# Patient Record
Sex: MALE | Race: WHITE | NOT HISPANIC OR LATINO | URBAN - METROPOLITAN AREA
[De-identification: names, ages, dates, MRNs, and addresses within clinical notes are randomized per-mention and may not be internally consistent; named-entity substitution may affect disease eponyms.]

---

## 2017-09-21 ENCOUNTER — OFFICE (OUTPATIENT)
Dept: URBAN - METROPOLITAN AREA CLINIC 10 | Facility: CLINIC | Age: 42
End: 2017-09-21
Payer: COMMERCIAL

## 2017-09-21 VITALS
HEIGHT: 73 IN | HEART RATE: 58 BPM | DIASTOLIC BLOOD PRESSURE: 80 MMHG | SYSTOLIC BLOOD PRESSURE: 117 MMHG | WEIGHT: 227 LBS

## 2017-09-21 DIAGNOSIS — R94.5 ABNORMAL RESULTS OF LIVER FUNCTION STUDIES: ICD-10-CM

## 2017-09-21 DIAGNOSIS — I10 ESSENTIAL (PRIMARY) HYPERTENSION: ICD-10-CM

## 2017-09-21 DIAGNOSIS — R12 HEARTBURN: ICD-10-CM

## 2017-09-21 DIAGNOSIS — Z12.11 ENCOUNTER FOR SCREENING FOR MALIGNANT NEOPLASM OF COLON: ICD-10-CM

## 2017-09-21 DIAGNOSIS — E66.3 OVERWEIGHT: ICD-10-CM

## 2017-09-21 DIAGNOSIS — K92.2 GASTROINTESTINAL HEMORRHAGE, UNSPECIFIED: ICD-10-CM

## 2017-09-21 DIAGNOSIS — R13.10 DYSPHAGIA, UNSPECIFIED: ICD-10-CM

## 2017-09-21 LAB
ACTIN (SMOOTH MUSCLE) ANTIBODY: 7 UNITS (ref 0–19)
ANTINUCLEAR ANTIBODIES, IFA: NEGATIVE
HBSAG SCREEN: NEGATIVE
HCV ANTIBODY: HEP C VIRUS AB: <0.1 S/CO RATIO
HEP A AB, TOTAL: NEGATIVE
HEP B SURFACE AB: HEP B SURFACE AB, QUAL: NON REACTIVE
IMMUNOGLOBULIN A, QN, SERUM: 214 MG/DL (ref 90–386)
T-TRANSGLUTAMINASE (TTG) IGA: <2 U/ML

## 2017-09-21 PROCEDURE — 99244 OFF/OP CNSLTJ NEW/EST MOD 40: CPT | Performed by: INTERNAL MEDICINE

## 2017-09-21 RX ORDER — PANTOPRAZOLE SODIUM 40 MG/1
TABLET, DELAYED RELEASE ORAL
Qty: 90 | Refills: 3 | Status: COMPLETED
Start: 2017-09-21 | End: 2020-07-06

## 2017-09-21 NOTE — SERVICEHPINOTES
Mr. Walker is 41 years old. He is here today in consultation with Dr. Jacobson for eye family history of colon cancer and elevated liver enzymes. His father had colon cancer at age 56. His paternal grandmother had colon cancer at an age >60. He had a colonoscopy 2014 in [place] and not polyps but told that he had colitis. He had a colonoscopy in 2001 with polyps and had to repeat 3 years later. He denies have genetic testing. He denies further colon polyps since 2001. He describes being diagnosed with pancreatitis in 2014. He describes having 2 bouts since that were milder. He describes his pancreatitis as extreme pain in his chest. He denies previous cholecystectomy. He consumes 15-18 EtOH drinks / week. He has occasional blood in his stool that he describes as bright red with otherwise normal appearing stools. He describes occasional sharp pain at anus with defecation that is only when "my diet goes off the rails". He describes long-standing heartburn and has associated dysphagia to solids without weight loss or hematemesis. He denies family history of liver disease.07/2017-review of records from Dr. Jacobson: AST 33/ ALT 52/total bili 0.2/alk-phos 65/albumin 4.6.BR-hematocrit 44.6, platelets 235, MCV 92.BR-TSH and free T4 normal.

## 2020-07-06 ENCOUNTER — OFFICE (OUTPATIENT)
Dept: URBAN - METROPOLITAN AREA CLINIC 10 | Facility: CLINIC | Age: 45
End: 2020-07-06

## 2020-07-06 VITALS
DIASTOLIC BLOOD PRESSURE: 73 MMHG | HEART RATE: 69 BPM | HEIGHT: 73 IN | WEIGHT: 212 LBS | SYSTOLIC BLOOD PRESSURE: 118 MMHG

## 2020-07-06 DIAGNOSIS — K58.0 IRRITABLE BOWEL SYNDROME WITH DIARRHEA: ICD-10-CM

## 2020-07-06 DIAGNOSIS — K85.90 ACUTE PANCREATITIS WITHOUT NECROSIS OR INFECTION, UNSPECIFIE: ICD-10-CM

## 2020-07-06 DIAGNOSIS — R94.5 ABNORMAL RESULTS OF LIVER FUNCTION STUDIES: ICD-10-CM

## 2020-07-06 DIAGNOSIS — Z80.0 FAMILY HISTORY OF MALIGNANT NEOPLASM OF DIGESTIVE ORGANS: ICD-10-CM

## 2020-07-06 LAB
CBC, PLATELET, NO DIFFERENTIAL: HEMATOCRIT: 48.5 % (ref 37.5–51)
CBC, PLATELET, NO DIFFERENTIAL: HEMOGLOBIN: 16.1 G/DL (ref 13–17.7)
CBC, PLATELET, NO DIFFERENTIAL: MCH: 31.2 PG (ref 26.6–33)
CBC, PLATELET, NO DIFFERENTIAL: MCHC: 33.2 G/DL (ref 31.5–35.7)
CBC, PLATELET, NO DIFFERENTIAL: MCV: 94 FL (ref 79–97)
CBC, PLATELET, NO DIFFERENTIAL: PLATELETS: 288 X10E3/UL (ref 150–450)
CBC, PLATELET, NO DIFFERENTIAL: RBC: 5.16 X10E6/UL (ref 4.14–5.8)
CBC, PLATELET, NO DIFFERENTIAL: RDW: 12.1 % (ref 11.6–15.4)
CBC, PLATELET, NO DIFFERENTIAL: WBC: 9.9 X10E3/UL (ref 3.4–10.8)
COMP. METABOLIC PANEL (14): A/G RATIO: 2 (ref 1.2–2.2)
COMP. METABOLIC PANEL (14): ALBUMIN: 4.7 G/DL (ref 4–5)
COMP. METABOLIC PANEL (14): ALKALINE PHOSPHATASE: 65 IU/L (ref 39–117)
COMP. METABOLIC PANEL (14): ALT (SGPT): 25 IU/L (ref 0–44)
COMP. METABOLIC PANEL (14): AST (SGOT): 21 IU/L (ref 0–40)
COMP. METABOLIC PANEL (14): BILIRUBIN, TOTAL: 0.2 MG/DL (ref 0–1.2)
COMP. METABOLIC PANEL (14): BUN/CREATININE RATIO: 20 (ref 9–20)
COMP. METABOLIC PANEL (14): BUN: 18 MG/DL (ref 6–24)
COMP. METABOLIC PANEL (14): CALCIUM: 9.8 MG/DL (ref 8.7–10.2)
COMP. METABOLIC PANEL (14): CARBON DIOXIDE, TOTAL: 25 MMOL/L (ref 20–29)
COMP. METABOLIC PANEL (14): CHLORIDE: 102 MMOL/L (ref 96–106)
COMP. METABOLIC PANEL (14): CREATININE: 0.9 MG/DL (ref 0.76–1.27)
COMP. METABOLIC PANEL (14): EGFR IF AFRICN AM: 121 ML/MIN/1.73 (ref 59–?)
COMP. METABOLIC PANEL (14): EGFR IF NONAFRICN AM: 105 ML/MIN/1.73 (ref 59–?)
COMP. METABOLIC PANEL (14): GLOBULIN, TOTAL: 2.4 G/DL (ref 1.5–4.5)
COMP. METABOLIC PANEL (14): GLUCOSE: 82 MG/DL (ref 65–99)
COMP. METABOLIC PANEL (14): POTASSIUM: 5.5 MMOL/L — HIGH (ref 3.5–5.2)
COMP. METABOLIC PANEL (14): PROTEIN, TOTAL: 7.1 G/DL (ref 6–8.5)
COMP. METABOLIC PANEL (14): SODIUM: 143 MMOL/L (ref 134–144)

## 2020-07-06 PROCEDURE — 99215 OFFICE O/P EST HI 40 MIN: CPT | Performed by: INTERNAL MEDICINE

## 2020-07-06 RX ORDER — SODIUM SULFATE, POTASSIUM SULFATE, MAGNESIUM SULFATE 17.5; 3.13; 1.6 G/ML; G/ML; G/ML
SOLUTION, CONCENTRATE ORAL
Qty: 1 | Refills: 0 | Status: COMPLETED
Start: 2020-07-06 | End: 2020-09-25

## 2020-07-06 RX ORDER — HYOSCYAMINE SULFATE 0.12 MG/1
TABLET ORAL; SUBLINGUAL
Qty: 30 | Refills: 3 | Status: ACTIVE
Start: 2020-07-06

## 2020-07-06 NOTE — SERVICEHPINOTES
Mr. Walker is 44 years old. He is here today in consultation with Dr. Goins for consideration of screening colonoscopy. He was here in 2017 and was scheduled for colonoscopy and EGD for dysphagia. He describes bilateral lower quadrant cramping with associated retchign that is non-productive. He will have diarreha when the pain occurs and is associated with blood in the stool. He describes as once / month and realted to eating although, he can't relate to a specific food. He feels better after diarrhea. He denies dysphaiga or heartburn.His father had colon cancer at age 56. His paternal grandmother had colon cancer at an age >60. He had a colonoscopy 2014 in [place] and told had colitis. He had a colonoscopy in 2001 with polyps and had to repeat 3 years later. He denies have genetic testing. He denies further colon polyps since 2001. He describes being diagnosed with pancreatitis in 2014. He denies EtOH. He stopped EtOH 1.5 years ago. He was drinking vodka and beer. He was drinking 1/5th week along with a case of beer / week. He had been drinking consistently for 25 years.  09/2017-hep B surface antibody nonreactiveBR-hep B surface antigen negativeBR-hep C antibody negativeBR-hep a total antibody negativeBR-anti smooth muscle antibody negativeBR-MC negativeBR-TTG IgA negative with normal IgA level07/2017-review of records from Dr. Jacobson: AST 33/ ALT 52/total bili 0.2/alk-phos 65/albumin 4.6.BR-hematocrit 44.6, platelets 235, MCV 92.BR-TSH and free T4 normal.

## 2020-09-25 ENCOUNTER — AMBULATORY SURGICAL CENTER (OUTPATIENT)
Dept: URBAN - METROPOLITAN AREA SURGERY 1 | Facility: SURGERY | Age: 45
End: 2020-09-25
Payer: COMMERCIAL

## 2020-09-25 VITALS
TEMPERATURE: 97.9 F | OXYGEN SATURATION: 96 % | DIASTOLIC BLOOD PRESSURE: 56 MMHG | DIASTOLIC BLOOD PRESSURE: 64 MMHG | DIASTOLIC BLOOD PRESSURE: 56 MMHG | OXYGEN SATURATION: 96 % | HEART RATE: 74 BPM | SYSTOLIC BLOOD PRESSURE: 97 MMHG | HEART RATE: 67 BPM | RESPIRATION RATE: 18 BRPM | SYSTOLIC BLOOD PRESSURE: 121 MMHG | HEIGHT: 73 IN | HEART RATE: 72 BPM | SYSTOLIC BLOOD PRESSURE: 107 MMHG | HEART RATE: 63 BPM | SYSTOLIC BLOOD PRESSURE: 107 MMHG | DIASTOLIC BLOOD PRESSURE: 56 MMHG | HEIGHT: 73 IN | TEMPERATURE: 98.1 F | SYSTOLIC BLOOD PRESSURE: 121 MMHG | SYSTOLIC BLOOD PRESSURE: 107 MMHG | RESPIRATION RATE: 16 BRPM | SYSTOLIC BLOOD PRESSURE: 102 MMHG | DIASTOLIC BLOOD PRESSURE: 63 MMHG | OXYGEN SATURATION: 98 % | SYSTOLIC BLOOD PRESSURE: 119 MMHG | SYSTOLIC BLOOD PRESSURE: 121 MMHG | DIASTOLIC BLOOD PRESSURE: 63 MMHG | SYSTOLIC BLOOD PRESSURE: 102 MMHG | RESPIRATION RATE: 16 BRPM | SYSTOLIC BLOOD PRESSURE: 119 MMHG | OXYGEN SATURATION: 99 % | DIASTOLIC BLOOD PRESSURE: 84 MMHG | HEART RATE: 72 BPM | DIASTOLIC BLOOD PRESSURE: 60 MMHG | HEART RATE: 74 BPM | RESPIRATION RATE: 18 BRPM | WEIGHT: 210 LBS | TEMPERATURE: 98.1 F | RESPIRATION RATE: 18 BRPM | DIASTOLIC BLOOD PRESSURE: 63 MMHG | WEIGHT: 210 LBS | HEART RATE: 67 BPM | DIASTOLIC BLOOD PRESSURE: 60 MMHG | HEIGHT: 73 IN | HEART RATE: 72 BPM | OXYGEN SATURATION: 99 % | RESPIRATION RATE: 20 BRPM | WEIGHT: 210 LBS | OXYGEN SATURATION: 96 % | DIASTOLIC BLOOD PRESSURE: 60 MMHG | HEART RATE: 63 BPM | SYSTOLIC BLOOD PRESSURE: 119 MMHG | DIASTOLIC BLOOD PRESSURE: 64 MMHG | OXYGEN SATURATION: 98 % | DIASTOLIC BLOOD PRESSURE: 84 MMHG | HEART RATE: 67 BPM | OXYGEN SATURATION: 98 % | DIASTOLIC BLOOD PRESSURE: 64 MMHG | RESPIRATION RATE: 16 BRPM | TEMPERATURE: 97.9 F | DIASTOLIC BLOOD PRESSURE: 84 MMHG | SYSTOLIC BLOOD PRESSURE: 97 MMHG | HEART RATE: 63 BPM | HEART RATE: 74 BPM | SYSTOLIC BLOOD PRESSURE: 102 MMHG | TEMPERATURE: 98.1 F | TEMPERATURE: 97.9 F | OXYGEN SATURATION: 99 % | RESPIRATION RATE: 20 BRPM | SYSTOLIC BLOOD PRESSURE: 97 MMHG | RESPIRATION RATE: 20 BRPM

## 2020-09-25 DIAGNOSIS — Z80.0 FAMILY HISTORY OF MALIGNANT NEOPLASM OF DIGESTIVE ORGANS: ICD-10-CM

## 2020-09-25 DIAGNOSIS — K57.30 DIVERTICULOSIS OF LARGE INTESTINE WITHOUT PERFORATION OR ABS: ICD-10-CM

## 2020-09-25 DIAGNOSIS — Z12.11 ENCOUNTER FOR SCREENING FOR MALIGNANT NEOPLASM OF COLON: ICD-10-CM

## 2020-09-25 DIAGNOSIS — K64.0 FIRST DEGREE HEMORRHOIDS: ICD-10-CM

## 2020-09-25 PROCEDURE — G0105 COLORECTAL SCRN; HI RISK IND: HCPCS | Performed by: INTERNAL MEDICINE

## 2020-09-25 NOTE — SERVICENOTES
Start time: 0724
Cecum: 0727
TI intubation: no 
End time:0732

[place] Bowel Prep Score :  8
Right colon:                       3
transverse colon:               2
left colon:                          3

## 2020-09-25 NOTE — SERVICENOTES
Start time: 0724
Cecum: 0727
TI intubation: no 
End time:0732

[place] Bowel Prep Score :  8
Right colon:                       3
transverse colon:               2
left colon:                          3

## 2020-09-25 NOTE — SERVICENOTES
Start time: 0724
Cecum: 0727
TI intubation: no 
End time:0732

[place] Bowel Prep Score :  8
Right colon:                       3
transverse colon:               2
left colon:                          3

## 2021-09-27 ENCOUNTER — OFFICE (OUTPATIENT)
Dept: URBAN - METROPOLITAN AREA CLINIC 10 | Facility: CLINIC | Age: 46
End: 2021-09-27
Payer: COMMERCIAL

## 2021-09-27 VITALS
DIASTOLIC BLOOD PRESSURE: 81 MMHG | HEIGHT: 73 IN | SYSTOLIC BLOOD PRESSURE: 111 MMHG | OXYGEN SATURATION: 94 % | HEART RATE: 65 BPM | WEIGHT: 218 LBS

## 2021-09-27 DIAGNOSIS — K64.4 RESIDUAL HEMORRHOIDAL SKIN TAGS: ICD-10-CM

## 2021-09-27 DIAGNOSIS — K92.1 MELENA: ICD-10-CM

## 2021-09-27 DIAGNOSIS — R12 HEARTBURN: ICD-10-CM

## 2021-09-27 DIAGNOSIS — K21.9 GASTRO-ESOPHAGEAL REFLUX DISEASE WITHOUT ESOPHAGITIS: ICD-10-CM

## 2021-09-27 DIAGNOSIS — K64.8 OTHER HEMORRHOIDS: ICD-10-CM

## 2021-09-27 LAB
AMYLASE: 123 U/L — HIGH (ref 31–110)
CBC, PLATELET, NO DIFFERENTIAL: HEMATOCRIT: 49.6 % (ref 37.5–51)
CBC, PLATELET, NO DIFFERENTIAL: HEMOGLOBIN: 16.2 G/DL (ref 13–17.7)
CBC, PLATELET, NO DIFFERENTIAL: MCH: 30.6 PG (ref 26.6–33)
CBC, PLATELET, NO DIFFERENTIAL: MCHC: 32.7 G/DL (ref 31.5–35.7)
CBC, PLATELET, NO DIFFERENTIAL: MCV: 94 FL (ref 79–97)
CBC, PLATELET, NO DIFFERENTIAL: PLATELETS: 259 X10E3/UL (ref 150–450)
CBC, PLATELET, NO DIFFERENTIAL: RBC: 5.3 X10E6/UL (ref 4.14–5.8)
CBC, PLATELET, NO DIFFERENTIAL: RDW: 13.1 % (ref 11.6–15.4)
CBC, PLATELET, NO DIFFERENTIAL: WBC: 7.3 X10E3/UL (ref 3.4–10.8)
COMP. METABOLIC PANEL (14): A/G RATIO: 2 (ref 1.2–2.2)
COMP. METABOLIC PANEL (14): ALBUMIN: 4.7 G/DL (ref 4–5)
COMP. METABOLIC PANEL (14): ALKALINE PHOSPHATASE: 74 IU/L (ref 44–121)
COMP. METABOLIC PANEL (14): ALT (SGPT): 25 IU/L (ref 0–44)
COMP. METABOLIC PANEL (14): AST (SGOT): 22 IU/L (ref 0–40)
COMP. METABOLIC PANEL (14): BILIRUBIN, TOTAL: 0.2 MG/DL (ref 0–1.2)
COMP. METABOLIC PANEL (14): BUN/CREATININE RATIO: 22 — HIGH (ref 9–20)
COMP. METABOLIC PANEL (14): BUN: 21 MG/DL (ref 6–24)
COMP. METABOLIC PANEL (14): CALCIUM: 10.3 MG/DL — HIGH (ref 8.7–10.2)
COMP. METABOLIC PANEL (14): CARBON DIOXIDE, TOTAL: 27 MMOL/L (ref 20–29)
COMP. METABOLIC PANEL (14): CHLORIDE: 102 MMOL/L (ref 96–106)
COMP. METABOLIC PANEL (14): CREATININE: 0.96 MG/DL (ref 0.76–1.27)
COMP. METABOLIC PANEL (14): EGFR IF AFRICN AM: 111 ML/MIN/1.73 (ref 59–?)
COMP. METABOLIC PANEL (14): EGFR IF NONAFRICN AM: 96 ML/MIN/1.73 (ref 59–?)
COMP. METABOLIC PANEL (14): GLOBULIN, TOTAL: 2.4 G/DL (ref 1.5–4.5)
COMP. METABOLIC PANEL (14): GLUCOSE: 84 MG/DL (ref 65–99)
COMP. METABOLIC PANEL (14): POTASSIUM: 4.8 MMOL/L (ref 3.5–5.2)
COMP. METABOLIC PANEL (14): PROTEIN, TOTAL: 7.1 G/DL (ref 6–8.5)
COMP. METABOLIC PANEL (14): SODIUM: 139 MMOL/L (ref 134–144)
LIPASE: 44 U/L (ref 13–78)

## 2021-09-27 PROCEDURE — 46600 DIAGNOSTIC ANOSCOPY SPX: CPT | Performed by: INTERNAL MEDICINE

## 2021-09-27 PROCEDURE — 99214 OFFICE O/P EST MOD 30 MIN: CPT | Mod: 25 | Performed by: INTERNAL MEDICINE

## 2021-09-27 RX ORDER — OMEPRAZOLE 40 MG/1
CAPSULE, DELAYED RELEASE ORAL
Qty: 90 | Refills: 3 | Status: ACTIVE
Start: 2021-09-27

## 2021-09-27 RX ORDER — SODIUM PICOSULFATE, MAGNESIUM OXIDE, AND ANHYDROUS CITRIC ACID 10; 3.5; 12 MG/160ML; G/160ML; G/160ML
LIQUID ORAL
Qty: 1 | Refills: 0 | Status: COMPLETED
Start: 2021-09-27 | End: 2021-10-22

## 2021-10-22 ENCOUNTER — AMBULATORY SURGICAL CENTER (OUTPATIENT)
Dept: URBAN - METROPOLITAN AREA SURGERY 1 | Facility: SURGERY | Age: 46
End: 2021-10-22

## 2021-10-22 VITALS
DIASTOLIC BLOOD PRESSURE: 68 MMHG | SYSTOLIC BLOOD PRESSURE: 110 MMHG | DIASTOLIC BLOOD PRESSURE: 89 MMHG | DIASTOLIC BLOOD PRESSURE: 68 MMHG | SYSTOLIC BLOOD PRESSURE: 112 MMHG | SYSTOLIC BLOOD PRESSURE: 116 MMHG | SYSTOLIC BLOOD PRESSURE: 125 MMHG | WEIGHT: 220 LBS | SYSTOLIC BLOOD PRESSURE: 115 MMHG | SYSTOLIC BLOOD PRESSURE: 115 MMHG | TEMPERATURE: 98.4 F | TEMPERATURE: 97.3 F | WEIGHT: 220 LBS | HEART RATE: 71 BPM | WEIGHT: 220 LBS | DIASTOLIC BLOOD PRESSURE: 57 MMHG | HEART RATE: 75 BPM | OXYGEN SATURATION: 97 % | HEART RATE: 71 BPM | HEART RATE: 75 BPM | HEIGHT: 73 IN | HEART RATE: 71 BPM | HEART RATE: 83 BPM | HEART RATE: 87 BPM | OXYGEN SATURATION: 99 % | HEART RATE: 87 BPM | SYSTOLIC BLOOD PRESSURE: 115 MMHG | SYSTOLIC BLOOD PRESSURE: 114 MMHG | SYSTOLIC BLOOD PRESSURE: 116 MMHG | OXYGEN SATURATION: 98 % | HEART RATE: 75 BPM | HEART RATE: 90 BPM | SYSTOLIC BLOOD PRESSURE: 112 MMHG | RESPIRATION RATE: 16 BRPM | DIASTOLIC BLOOD PRESSURE: 63 MMHG | OXYGEN SATURATION: 100 % | OXYGEN SATURATION: 97 % | DIASTOLIC BLOOD PRESSURE: 70 MMHG | SYSTOLIC BLOOD PRESSURE: 114 MMHG | HEART RATE: 83 BPM | DIASTOLIC BLOOD PRESSURE: 57 MMHG | OXYGEN SATURATION: 96 % | SYSTOLIC BLOOD PRESSURE: 110 MMHG | RESPIRATION RATE: 18 BRPM | OXYGEN SATURATION: 98 % | OXYGEN SATURATION: 98 % | OXYGEN SATURATION: 100 % | SYSTOLIC BLOOD PRESSURE: 125 MMHG | TEMPERATURE: 98.4 F | DIASTOLIC BLOOD PRESSURE: 89 MMHG | HEART RATE: 69 BPM | HEIGHT: 73 IN | TEMPERATURE: 98.4 F | OXYGEN SATURATION: 99 % | SYSTOLIC BLOOD PRESSURE: 116 MMHG | HEART RATE: 90 BPM | HEIGHT: 73 IN | SYSTOLIC BLOOD PRESSURE: 110 MMHG | OXYGEN SATURATION: 97 % | DIASTOLIC BLOOD PRESSURE: 70 MMHG | HEART RATE: 87 BPM | DIASTOLIC BLOOD PRESSURE: 70 MMHG | DIASTOLIC BLOOD PRESSURE: 63 MMHG | DIASTOLIC BLOOD PRESSURE: 57 MMHG | OXYGEN SATURATION: 100 % | HEART RATE: 90 BPM | OXYGEN SATURATION: 96 % | OXYGEN SATURATION: 96 % | SYSTOLIC BLOOD PRESSURE: 125 MMHG | DIASTOLIC BLOOD PRESSURE: 63 MMHG | RESPIRATION RATE: 16 BRPM | TEMPERATURE: 97.3 F | RESPIRATION RATE: 18 BRPM | RESPIRATION RATE: 18 BRPM | HEART RATE: 69 BPM | HEART RATE: 83 BPM | SYSTOLIC BLOOD PRESSURE: 112 MMHG | HEART RATE: 69 BPM | SYSTOLIC BLOOD PRESSURE: 114 MMHG | DIASTOLIC BLOOD PRESSURE: 68 MMHG | RESPIRATION RATE: 16 BRPM | TEMPERATURE: 97.3 F | DIASTOLIC BLOOD PRESSURE: 89 MMHG | OXYGEN SATURATION: 99 %

## 2021-10-22 DIAGNOSIS — K92.1 MELENA: ICD-10-CM

## 2021-10-22 DIAGNOSIS — Z80.0 FAMILY HISTORY OF MALIGNANT NEOPLASM OF DIGESTIVE ORGANS: ICD-10-CM

## 2021-10-22 DIAGNOSIS — K57.30 DIVERTICULOSIS OF LARGE INTESTINE WITHOUT PERFORATION OR ABS: ICD-10-CM

## 2021-10-22 PROCEDURE — 45378 DIAGNOSTIC COLONOSCOPY: CPT | Performed by: INTERNAL MEDICINE

## 2021-10-22 NOTE — SERVICENOTES
Start: 1426
Cecum:1429
TI intubation: no
End:1435

[place] Bowel Prep Score:     4
-right colon:                              1
-transverse colon:                    1
-left colon:                                  2

## 2021-10-22 NOTE — SERVICEHPINOTES
45-year-old with family history of colon cancer in father at age less than 60 here for evaluation of hematocheziabr

## 2021-10-22 NOTE — SERVICENOTES
Start: 1426
Cecum:1429
TI intubation: no
End:1435

[place] Bowel Prep Score:     4
-right colon:                              1
-transverse colon:                    1
-left colon:                                  2

## 2021-10-22 NOTE — SERVICENOTES
Start: 1426
Cecum:1429
TI intubation: no
End:1435

[place] Bowel Prep Score:     4
-right colon:                              1
-transverse colon:                    1
-left colon:                                  2

## 2022-03-07 ENCOUNTER — OFFICE (OUTPATIENT)
Dept: URBAN - METROPOLITAN AREA CLINIC 10 | Facility: CLINIC | Age: 47
End: 2022-03-07

## 2022-03-07 VITALS
OXYGEN SATURATION: 98 % | WEIGHT: 241 LBS | DIASTOLIC BLOOD PRESSURE: 79 MMHG | SYSTOLIC BLOOD PRESSURE: 118 MMHG | HEIGHT: 73 IN | HEART RATE: 74 BPM

## 2022-03-07 DIAGNOSIS — K92.1 MELENA: ICD-10-CM

## 2022-03-07 PROCEDURE — 99213 OFFICE O/P EST LOW 20 MIN: CPT | Performed by: INTERNAL MEDICINE

## 2022-03-07 NOTE — SERVICEHPINOTES
Mr. Walker is 46 years old. He is here for follow up of rectal bleeding. he denies any rectal bleeding as long as he is taking his fiber supplement.  He has no GI complaints.  We discussed limiting prep is colonoscopy.  Given that his bleeding has resolved and he had a normal colonoscopy in 2020 we discussed utility of repeating  Colonoscopy. he would like to wait until his next recall colonoscopy unless symptoms arise.His father had colon cancer at age 56. His paternal grandmother had colon cancer at an age >60. He had a colonoscopy 2014 in [place] and told had colitis. He had a colonoscopy in 2001 with polyps and had to repeat 3 years later. He denies having genetic testing. He denies further colon polyps since 2001.He describes being diagnosed with pancreatitis in 2014.He denies EtOH. He stopped EtOH 1.5 years ago. He was drinking vodka and beer. He was drinking 1/5th week along with a case of beer / week. He had been drinking consistently for 25 years.
br
br10/2021-colonoscopy for family history of colon cancer and evaluation of hematochezia: Suboptimal prep. Sigmoid diverticulosis. Hemorrhoidsbr-CT of the abdomen pelvis with contrast: Mild circumferential thickening of the urinary bladder. Prostate normal. Degenerative spine changes. Pancreas normal. Gallbladder unremarkable.09/2021-AST 22/ALT 25/TB 0.2/alp 74br-calcium 10.3br-creatinine 0.96br-CBC normalbr-amylase 123 (normal )br-lipase normal at 46 09/2020-colonoscopy for high risk screening in the setting of family history of colon cancer in father at age less than 60: Sigmoid diverticulosis. Hemorrhoids.07/2020-AST 21/ALT 25/TB 0.2/alp 65/albumin 4.7br-creatinine 0.9br-WBC 9.9/HCT 40.5/MCV 94/PLT 33165/2017-hep B surface antibody nonreactivebr-hep B surface antigen negativebr-hep C antibody negativebr-hep a total antibody negativebr-anti smooth muscle antibody negativebr-MC negativebr-TTG IgA negative with normal IgA level07/2017-review of records from Dr. Jacobson: AST 33/ ALT 52/total bili 0.2/alk-phos 65/albumin 4.6.br-hematocrit 44.6, platelets 235, MCV 92.br-TSH and free T4 normal.